# Patient Record
Sex: MALE
[De-identification: names, ages, dates, MRNs, and addresses within clinical notes are randomized per-mention and may not be internally consistent; named-entity substitution may affect disease eponyms.]

---

## 2018-11-29 ENCOUNTER — HOSPITAL ENCOUNTER (EMERGENCY)
Dept: HOSPITAL 42 - ED | Age: 23
Discharge: HOME | End: 2018-11-29
Payer: MEDICAID

## 2018-11-29 VITALS — HEART RATE: 78 BPM | OXYGEN SATURATION: 100 % | SYSTOLIC BLOOD PRESSURE: 133 MMHG | DIASTOLIC BLOOD PRESSURE: 71 MMHG

## 2018-11-29 VITALS — RESPIRATION RATE: 18 BRPM | TEMPERATURE: 98.4 F

## 2018-11-29 VITALS — BODY MASS INDEX: 34.9 KG/M2

## 2018-11-29 DIAGNOSIS — R10.30: Primary | ICD-10-CM

## 2018-11-29 LAB
ALBUMIN SERPL-MCNC: 4.5 G/DL (ref 3–4.8)
ALBUMIN/GLOB SERPL: 1.1 {RATIO} (ref 1.1–1.8)
ALT SERPL-CCNC: 35 U/L (ref 7–56)
AST SERPL-CCNC: 30 U/L (ref 17–59)
BASOPHILS # BLD AUTO: 0.03 K/MM3 (ref 0–2)
BASOPHILS NFR BLD: 0.3 % (ref 0–3)
BUN SERPL-MCNC: 10 MG/DL (ref 7–21)
CALCIUM SERPL-MCNC: 9 MG/DL (ref 8.4–10.5)
EOSINOPHIL # BLD: 0.1 10*3/UL (ref 0–0.7)
EOSINOPHIL NFR BLD: 1.4 % (ref 1.5–5)
ERYTHROCYTE [DISTWIDTH] IN BLOOD BY AUTOMATED COUNT: 13.2 % (ref 11.5–14.5)
GFR NON-AFRICAN AMERICAN: > 60
GRANULOCYTES # BLD: 6.11 10*3/UL (ref 1.4–6.5)
GRANULOCYTES NFR BLD: 62.7 % (ref 50–68)
HGB BLD-MCNC: 14.1 G/DL (ref 14–18)
LIPASE SERPL-CCNC: 57 U/L (ref 23–300)
LYMPHOCYTES # BLD: 3 10*3/UL (ref 1.2–3.4)
LYMPHOCYTES NFR BLD AUTO: 30.4 % (ref 22–35)
MCH RBC QN AUTO: 28.4 PG (ref 25–35)
MCHC RBC AUTO-ENTMCNC: 33.6 G/DL (ref 31–37)
MCV RBC AUTO: 84.5 FL (ref 80–105)
MONOCYTES # BLD AUTO: 0.5 10*3/UL (ref 0.1–0.6)
MONOCYTES NFR BLD: 5.2 % (ref 1–6)
PLATELET # BLD: 216 10^3/UL (ref 120–450)
PMV BLD AUTO: 12.5 FL (ref 7–11)
RBC # BLD AUTO: 4.97 10^6/UL (ref 3.5–6.1)
WBC # BLD AUTO: 9.8 10^3/UL (ref 4.5–11)

## 2018-11-29 PROCEDURE — 74177 CT ABD & PELVIS W/CONTRAST: CPT

## 2018-11-29 PROCEDURE — 83690 ASSAY OF LIPASE: CPT

## 2018-11-29 PROCEDURE — 83735 ASSAY OF MAGNESIUM: CPT

## 2018-11-29 PROCEDURE — 99284 EMERGENCY DEPT VISIT MOD MDM: CPT

## 2018-11-29 PROCEDURE — 85025 COMPLETE CBC W/AUTO DIFF WBC: CPT

## 2018-11-29 PROCEDURE — 80053 COMPREHEN METABOLIC PANEL: CPT

## 2018-11-29 NOTE — ED PDOC
Arrival/HPI





- General


Chief Complaint: Abdominal Pain


Historian: Patient





- History of Present Illness


Narrative History of Present Illness (Text): 





11/29/18 16:37


24 y/o male, no significant pmh, nkda, c/o lower abdominal pain x 2 days.  

Aching pain, admits moving heavy objects, on and off, aggravated by movement and

stated that he is not hungry but did had breakfast this morning with no 

nausea/vomiting/diarrhea, no fever or chills, no night sweat, no numbness or 

tingling, no urinary symptoms, no palpitation, no other medical or psychological

complaints. 








Past Medical History





- Provider Review


Nursing Documentation Reviewed: Yes





- Infectious Disease


Hx of Infectious Diseases: None





- Psychiatric


Hx Substance Use: No





Family/Social History





- Physician Review


Nursing Documentation Reviewed: Yes


Family/Social History: Unknown Family HX


Smoking Status: Never Smoked


Hx Alcohol Use: Yes


Frequency of alcohol use: Socially


Hx Substance Use: No





Allergies/Home Meds


Allergies/Adverse Reactions: 


Allergies





No Known Allergies Allergy (Verified 11/22/16 08:13)


   











Review of Systems





- Review of Systems


Constitutional: absent: Fatigue, Fevers


Eyes: absent: Vision Changes


ENT: absent: Hearing Changes


Respiratory: absent: SOB, Cough


Cardiovascular: absent: Chest Pain


Gastrointestinal: Abdominal Pain.  absent: Diarrhea, Nausea, Vomiting


Musculoskeletal: absent: Arthralgias, Back Pain


Skin: absent: Rash, Pruritis


Neurological: absent: Headache, Dizziness


Psychiatric: absent: Anxiety, Depression, Suicidal Ideation





Physical Exam


Vital Signs Reviewed: Yes


Temperature: Afebrile


Blood Pressure: Normal


Pulse: Regular


Respiratory Rate: Normal


Appearance: Positive for: Well-Appearing, Non-Toxic, Comfortable


Pain Distress: Mild


Mental Status: Positive for: Alert and Oriented X 3





- Systems Exam


Head: Present: Atraumatic, Normocephalic


Pupils: Present: PERRL


Extroacular Muscles: Present: EOMI


Conjunctiva: Present: Normal


Mouth: Present: Moist Mucous Membranes


Neck: Present: Normal Range of Motion


Respiratory/Chest: Present: Clear to Auscultation, Good Air Exchange.  No: 

Respiratory Distress, Accessory Muscle Use


Cardiovascular: Present: Regular Rate and Rhythm, Normal S1, S2.  No: Murmurs


Abdomen: Present: Tenderness (mild periumbilical), Normal Bowel Sounds.  No: 

Distention, Peritoneal Signs, Rebound, Guarding, McBurney's Point Tender, 

Rovsing's Sign Present


Back: Present: Normal Inspection


Upper Extremity: Present: Normal Inspection.  No: Cyanosis, Edema


Lower Extremity: Present: Normal Inspection.  No: Edema


Neurological: Present: GCS=15, CN II-XII Intact, Speech Normal


Skin: Present: Warm, Dry, Normal Color.  No: Rashes


Psychiatric: Present: Alert, Oriented x 3, Normal Insight, Normal Concentration





Medical Decision Making


ED Course and Treatment: 





11/29/18 16:44


-labs


-CT abdomen and pelvis


-IV pepcid


-observe and reassess





11/29/18 20:33


-Labs show no acute findings


-Lipase within normal limit


-Mg within normal limit


-UA ordered but pt. refused, no urinary symptoms, cancelled. 


-CT abdomen and pelvis No acute intra-abdominal abnormality.


-All labs/radiology results discussed, likely muscular skeletal pain since its 

aggravated by active movement, no hernia palpated, no pain now.


-Discharge home with motrin, bed rest, follow up with your own pmd and GI within

2 days, return to the ER for any new or worsening signs or symptoms. 








- RAD Interpretation


Radiology Orders: 





EXAM: 


CT Abdomen with IV contrast 





CLINICAL HISTORY: 


PERIUMBILICAL PAIN X 2 DAYS 





TECHNIQUE: 


Axial computed tomography images of the abdomen and pelvis with intravenous 

contrast. 


1295.45 mGy-cm 





CONTRAST: 


With; OMNI 350 150 ml 





COMPARISON: 


None provided. 





FINDINGS: 





LUNG BASES: 


The lung bases appear clear. No pleural effusions are seen. 





LIVER: 


Unremarkable. 





GALLBLADDER AND BILE DUCTS: 


The gallbladder appears within normal limits. No radioopaque gallstones are 

seen. No biliary ductal dilatation is evident. 





PANCREAS: 


Unremarkable. 





SPLEEN: 


Unremarkable. 





ADRENAL GLANDS: 


Unremarkable. 





KIDNEYS, URETERS, AND BLADDER: 


The kidneys appear within normal limits. There is no hydronephrosis or 

hydroureter. No urinary calculi are seen. 





STOMACH AND BOWEL: 


Unremarkable appearance of the stomach and bowel. No evidence of bowel 

obstruction. No evidence suggesting enteritis or colitis. 





APPENDIX: 


No evidence of acute appendicitis on CT examination. 





PERITONEUM: 


No free fluid. No free air. 





LYMPH NODES: 


No lymphadenopathy is evident. 





VASCULATURE: 


No evidence of abdominal aortic aneurysm. 





BONES: 


No aggressive appearing osseous lesion. No acute osseous pathology evident. 





IMPRESSION: 





No acute intra-abdominal abnormality.


 


Electronically signed on Nov 29, 2018 8:06:00 PM EST by:


Jose Maria Krakovitz, M.D., Certified by [facility], Diagnostic Radiology


: Radiologist





- PA / NP / Resident Statement


MD/DO has reviewed & agrees with the documentation as recorded.





Disposition/Present on Arrival





- Present on Arrival


Any Indicators Present on Arrival: No


History of DVT/PE: No


History of Uncontrolled Diabetes: No


Urinary Catheter: No


History of Decub. Ulcer: No


History Surgical Site Infection Following: None





- Disposition


Have Diagnosis and Disposition been Completed?: Yes


Diagnosis: 


 Lower abdominal pain





Disposition: HOME/ ROUTINE


Disposition Time: 20:34


Patient Plan: Discharge


Condition: GOOD


Additional Instructions: 


-Discharge home with motrin, bed rest, follow up with your own pmd and GI within

2 days, return to the ER for any new or worsening signs or symptoms. 





Prescriptions: 


Ibuprofen [Motrin Tab] 600 mg PO QID PRN #30 tab


 PRN Reason: Other


Referrals: 


[facility] [place] - Follow up with primary


Ari Pacheco MD [Staff Provider] - Follow up with primary


Forms:  [facility] (English), WORK NOTE

## 2018-11-30 NOTE — CT
Date of service: 



11/29/2018



PROCEDURE:  CT Abdomen and Pelvis with and without intravenous 

contrast



HISTORY:

periumbilical pain x 2 days.



COMPARISON:

None.



TECHNIQUE:

Axial images of the abdomen were obtained in the pre contrast, portal 

venous and delayed phases of enhancement. Coronal and sagittal 

reformats were generated.



Contrast dose: 



Radiation dose:



Total exam DLP = 1295.45 mGy-cm.



This CT exam was performed using one or more of the following dose 

reduction techniques: Automated exposure control, adjustment of the 

mA and/or kV according to patient size, and/or use of iterative 

reconstruction technique.



FINDINGS:



LOWER THORAX:

Unremarkable. 



LIVER:

Unremarkable. No gross lesion or ductal dilatation. 



GALLBLADDER AND BILE DUCTS:

Unremarkable. 



PANCREAS:

Unremarkable. No gross lesion or ductal dilatation.



SPLEEN:

Unremarkable. 



ADRENALS:

Unremarkable. No mass. 



KIDNEYS AND URETERS:

Unremarkable. No hydronephrosis. No solid mass. 



VASCULATURE:

Unremarkable. No aortic aneurysm. No aortic atherosclerotic 

calcification or mural plaque present.



BOWEL:

Unremarkable. No obstruction. No gross mural thickening. 



APPENDIX:

Normal appendix. 



PERITONEUM:

Unremarkable. No free fluid. No free air. 



LYMPH NODES:

Unremarkable. No enlarged lymph nodes. 



BLADDER:

Unremarkable. 



REPRODUCTIVE:

Unremarkable. 



BONES:

No acute fracture. 



OTHER FINDINGS:

None.



IMPRESSION:

Unremarkable pre and post contrast enhanced CT of the abdomen and 

pelvis.